# Patient Record
Sex: MALE | Race: WHITE | Employment: FULL TIME | URBAN - METROPOLITAN AREA
[De-identification: names, ages, dates, MRNs, and addresses within clinical notes are randomized per-mention and may not be internally consistent; named-entity substitution may affect disease eponyms.]

---

## 2024-05-14 ENCOUNTER — TELEPHONE (OUTPATIENT)
Age: 74
End: 2024-05-14

## 2024-05-14 NOTE — TELEPHONE ENCOUNTER
Patient wants to know when he should get blood work done before his 11/14 appointment.       Patient # [phone]

## 2024-05-14 NOTE — TELEPHONE ENCOUNTER
RC to pt, ID verified. Advised pt he was due to have cholesterol checked 4/2024, as that would be 1 year since last check. Pt understands and will have it checked soon. No further questions.

## 2024-05-15 DIAGNOSIS — I10 ESSENTIAL (PRIMARY) HYPERTENSION: ICD-10-CM

## 2024-05-15 DIAGNOSIS — I25.10 ATHEROSCLEROSIS OF NATIVE CORONARY ARTERY OF NATIVE HEART WITHOUT ANGINA PECTORIS: ICD-10-CM

## 2024-05-16 LAB
ALBUMIN SERPL-MCNC: 4.2 G/DL (ref 3.5–5)
ALBUMIN/GLOB SERPL: 1.5 (ref 1.1–2.2)
ALP SERPL-CCNC: 100 U/L (ref 45–117)
ALT SERPL-CCNC: 43 U/L (ref 12–78)
ANION GAP SERPL CALC-SCNC: 6 MMOL/L (ref 5–15)
AST SERPL-CCNC: 23 U/L (ref 15–37)
BILIRUB SERPL-MCNC: 0.7 MG/DL (ref 0.2–1)
BUN SERPL-MCNC: 14 MG/DL (ref 6–20)
BUN/CREAT SERPL: 14 (ref 12–20)
CALCIUM SERPL-MCNC: 10.1 MG/DL (ref 8.5–10.1)
CHLORIDE SERPL-SCNC: 108 MMOL/L (ref 97–108)
CHOLEST SERPL-MCNC: 152 MG/DL
CO2 SERPL-SCNC: 25 MMOL/L (ref 21–32)
CREAT SERPL-MCNC: 0.98 MG/DL (ref 0.7–1.3)
GLOBULIN SER CALC-MCNC: 2.8 G/DL (ref 2–4)
GLUCOSE SERPL-MCNC: 112 MG/DL (ref 65–100)
HDLC SERPL-MCNC: 53 MG/DL
HDLC SERPL: 2.9 (ref 0–5)
LDLC SERPL CALC-MCNC: 75.6 MG/DL (ref 0–100)
POTASSIUM SERPL-SCNC: 4.3 MMOL/L (ref 3.5–5.1)
PROT SERPL-MCNC: 7 G/DL (ref 6.4–8.2)
SODIUM SERPL-SCNC: 139 MMOL/L (ref 136–145)
TRIGL SERPL-MCNC: 117 MG/DL
VLDLC SERPL CALC-MCNC: 23.4 MG/DL

## 2024-05-20 LAB
COMMENT:: NORMAL
SPECIMEN HOLD: NORMAL

## 2024-06-05 NOTE — TELEPHONE ENCOUNTER
Rios Savage MD  You2 days ago       Yes it is okay     You  Rios Savage MD6 days ago     ES  His next visit isnt until November, is that okay?     Rios Savage MD  You; Ruma Bee, RN6 days ago       Lipids are Noted optimal will need a lower LDL we can discuss this at the next office visit.  Liver function test and BMP are normal.     TC to pt, ID verified. Advised as above. No further questions.

## 2024-11-08 DIAGNOSIS — I25.10 ATHEROSCLEROSIS OF NATIVE CORONARY ARTERY OF NATIVE HEART WITHOUT ANGINA PECTORIS: Primary | ICD-10-CM

## 2024-11-08 DIAGNOSIS — E78.5 HYPERLIPIDEMIA: ICD-10-CM

## 2024-11-11 DIAGNOSIS — E78.5 HYPERLIPIDEMIA: ICD-10-CM

## 2024-11-11 DIAGNOSIS — I25.10 ATHEROSCLEROSIS OF NATIVE CORONARY ARTERY OF NATIVE HEART WITHOUT ANGINA PECTORIS: ICD-10-CM

## 2024-11-11 LAB
ALBUMIN SERPL-MCNC: 4.3 G/DL (ref 3.5–5)
ALBUMIN/GLOB SERPL: 1.5 (ref 1.1–2.2)
ALP SERPL-CCNC: 95 U/L (ref 45–117)
ALT SERPL-CCNC: 46 U/L (ref 12–78)
AST SERPL-CCNC: 21 U/L (ref 15–37)
BILIRUB DIRECT SERPL-MCNC: 0.2 MG/DL (ref 0–0.2)
BILIRUB SERPL-MCNC: 0.8 MG/DL (ref 0.2–1)
CHOLEST SERPL-MCNC: 155 MG/DL
GLOBULIN SER CALC-MCNC: 2.8 G/DL (ref 2–4)
HDLC SERPL-MCNC: 54 MG/DL
HDLC SERPL: 2.9 (ref 0–5)
LDLC SERPL CALC-MCNC: 72.8 MG/DL (ref 0–100)
PROT SERPL-MCNC: 7.1 G/DL (ref 6.4–8.2)
TRIGL SERPL-MCNC: 141 MG/DL
VLDLC SERPL CALC-MCNC: 28.2 MG/DL

## 2024-11-14 ENCOUNTER — OFFICE VISIT (OUTPATIENT)
Age: 74
End: 2024-11-14

## 2024-11-14 ENCOUNTER — TELEPHONE (OUTPATIENT)
Age: 74
End: 2024-11-14

## 2024-11-14 VITALS
BODY MASS INDEX: 32.34 KG/M2 | SYSTOLIC BLOOD PRESSURE: 118 MMHG | HEIGHT: 73 IN | HEART RATE: 80 BPM | DIASTOLIC BLOOD PRESSURE: 82 MMHG | OXYGEN SATURATION: 93 % | WEIGHT: 244 LBS

## 2024-11-14 DIAGNOSIS — I25.10 ATHEROSCLEROSIS OF NATIVE CORONARY ARTERY OF NATIVE HEART WITHOUT ANGINA PECTORIS: Primary | ICD-10-CM

## 2024-11-14 DIAGNOSIS — I25.10 ATHEROSCLEROSIS OF NATIVE CORONARY ARTERY OF NATIVE HEART WITHOUT ANGINA PECTORIS: ICD-10-CM

## 2024-11-14 DIAGNOSIS — I10 ESSENTIAL (PRIMARY) HYPERTENSION: ICD-10-CM

## 2024-11-14 DIAGNOSIS — R06.02 SOB (SHORTNESS OF BREATH): ICD-10-CM

## 2024-11-14 DIAGNOSIS — E78.5 HYPERLIPIDEMIA: ICD-10-CM

## 2024-11-14 DIAGNOSIS — Z01.812 PRE-PROCEDURE LAB EXAM: ICD-10-CM

## 2024-11-14 DIAGNOSIS — R06.02 SHORTNESS OF BREATH: ICD-10-CM

## 2024-11-14 DIAGNOSIS — R06.02 SOB (SHORTNESS OF BREATH): Primary | ICD-10-CM

## 2024-11-14 LAB
ANION GAP SERPL CALC-SCNC: 5 MMOL/L (ref 2–12)
BUN SERPL-MCNC: 11 MG/DL (ref 6–20)
BUN/CREAT SERPL: 11 (ref 12–20)
CALCIUM SERPL-MCNC: 9.8 MG/DL (ref 8.5–10.1)
CHLORIDE SERPL-SCNC: 109 MMOL/L (ref 97–108)
CO2 SERPL-SCNC: 26 MMOL/L (ref 21–32)
CREAT SERPL-MCNC: 1.01 MG/DL (ref 0.7–1.3)
ERYTHROCYTE [DISTWIDTH] IN BLOOD BY AUTOMATED COUNT: 12.4 % (ref 11.5–14.5)
GLUCOSE SERPL-MCNC: 115 MG/DL (ref 65–100)
HCT VFR BLD AUTO: 46.4 % (ref 36.6–50.3)
HGB BLD-MCNC: 15.4 G/DL (ref 12.1–17)
MCH RBC QN AUTO: 30.1 PG (ref 26–34)
MCHC RBC AUTO-ENTMCNC: 33.2 G/DL (ref 30–36.5)
MCV RBC AUTO: 90.6 FL (ref 80–99)
NRBC # BLD: 0 K/UL (ref 0–0.01)
NRBC BLD-RTO: 0 PER 100 WBC
PLATELET # BLD AUTO: 220 K/UL (ref 150–400)
PMV BLD AUTO: 10.8 FL (ref 8.9–12.9)
POTASSIUM SERPL-SCNC: 4.4 MMOL/L (ref 3.5–5.1)
RBC # BLD AUTO: 5.12 M/UL (ref 4.1–5.7)
SODIUM SERPL-SCNC: 140 MMOL/L (ref 136–145)
WBC # BLD AUTO: 11.3 K/UL (ref 4.1–11.1)

## 2024-11-14 RX ORDER — ATORVASTATIN CALCIUM 40 MG/1
40 TABLET, FILM COATED ORAL DAILY
Qty: 90 TABLET | Refills: 1 | Status: SHIPPED | OUTPATIENT
Start: 2024-11-14

## 2024-11-14 ASSESSMENT — PATIENT HEALTH QUESTIONNAIRE - PHQ9
SUM OF ALL RESPONSES TO PHQ QUESTIONS 1-9: 0
SUM OF ALL RESPONSES TO PHQ9 QUESTIONS 1 & 2: 0
SUM OF ALL RESPONSES TO PHQ QUESTIONS 1-9: 0
2. FEELING DOWN, DEPRESSED OR HOPELESS: NOT AT ALL
1. LITTLE INTEREST OR PLEASURE IN DOING THINGS: NOT AT ALL
SUM OF ALL RESPONSES TO PHQ QUESTIONS 1-9: 0
SUM OF ALL RESPONSES TO PHQ QUESTIONS 1-9: 0

## 2024-11-14 NOTE — PROGRESS NOTES
1. Have you been to the ER, urgent care clinic since your last visit?  Hospitalized since your last visit?No    2. Have you seen or consulted any other health care providers outside of the [facility] since your last visit?  Include any pap smears or colon screening. No

## 2024-11-14 NOTE — TELEPHONE ENCOUNTER
Left message for patient to return call to schedule procedure.    ----- Message from RN Ruma RUIZ RN sent at 11/14/2024  9:16 AM EST -----  Regarding: [facility]  Please schedule [facility] ( [id number]) for CAD ( I25.10) and SOB ( R06.02). To  have labs obtained today.  Does not need to hold any medications.  Can be with Dr. Beck or Dr. Aguero. Patient going out of town from 11-17-24 to few days prior to Thanksgiving.  Follow up made with Dr. Savage on 1-3-24.  Can change if needed.  Thanks, Ruma

## 2024-11-14 NOTE — PROGRESS NOTES
[place] [facility]                                     DIVISION OF CARDIOLOGY                                                                             OFFICE NOTE                  VIC ONTIVEROS M.D. , [place]            KOFFI ADAMSON DIFOGGI   1950  [id number]    Date/Time:  11/14/20248:35 AM      /82 (Site: Left Upper Arm, Position: Sitting, Cuff Size: Large Adult)   Pulse 80   Ht 1.854 m (6' 1\")   Wt 110.7 kg (244 lb)   SpO2 93%   BMI 32.19 kg/m²        Wt Readings from Last 3 Encounters:   11/14/24 110.7 kg (244 lb)   12/21/23 108.9 kg (240 lb)   11/02/23 108.9 kg (240 lb)        Lab Results   Component Value Date    CHOL 155 11/11/2024    TRIG 141 11/11/2024    HDL 54 11/11/2024    LDL 72.8 11/11/2024    VLDL 28.2 11/11/2024    CHOLHDLRATIO 2.9 11/11/2024     Lab Results   Component Value Date    ALT 46 11/11/2024    AST 21 11/11/2024    ALKPHOS 95 11/11/2024    BILITOT 0.8 11/11/2024               SUBJECTIVE:  He tells me has been slowing down much more fatigue shortness of breath with activities is reported no clear chest pain.  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  He continues to try to be active but once again unable to do things he used to do before       Assessment/Plan    1.  CAD: Status post PCI of the LAD in March 2013.  He does have a residual significant RCA stenosis which usually is not detected by nuclear stress test in fact last nuclear stress test with December 2023 failed to reveal any ischemic AAA issues in inferior wall especially.    His electrocardiogram today is unchanged with a normal sinus rhythm right bundle branch block and left anterior fascicular block.  No significant ST-T changes.    Nonetheless some concern about the new onset symptoms I am not sure why repeating nuclear stress test will be very helpful in his case given already known underlying coronary disease.    We have discussed this in a shared decision decided

## 2024-11-14 NOTE — TELEPHONE ENCOUNTER
Spoke to patient and scheduled him for [facility] with Dr. Aguero on 12/5 @ 10:45 am with 8:45 am arrival. Instructions sent to patient via my chart and advised labs already done, no medications to hold. Patient verbalized understanding and had no questions at the time of call.    Patient identification verified x2.     Patient Instructions    Catheterization   Pre-procedure instructions  Lab work: Already done.  [facility] Draw Sites:  [facility] [place]: [address] [facility] [address]  [place]: [address] [facility] [address]  [place]: [address] [address] [facility] [address]  [facility]: [address] [facility] [address]  [place]: [address] [facility] [address]  [facility]: [address] [facility] [address]  [place]: [address] [facility] [address]  [facility]: [address]. [place] [address]  You may have clear liquids up to 2 hours prior to your procedure and a light meal up to 6 hours prior to your procedure.   Drink ten 8oz. glasses of water for 3-days prior-to and 3-days post cardiac cath.  Stop blood thinners 2 days prior to procedure EXCEPT: Brilinta, Plavix or Aspirin  Medications restrictions: No medications to hold.    Procedure day  Have a  that will bring you and take you home (6-8 hours)  Have a responsible adult that can stay with you for 24 hours  Bring ID and insurance card  Wear comfortable clothing  Leave valuables at home,   Bring: dentures, hearing aids, or glasses  Bring overnight bag (just in case of an overnight stay)  Where to report  [facility]: go through main entrance and to the left is outpatient registration    Date of procedure: 12/5 w/ Dr. Aguero  Arrival Time:  8:45 am    Post procedure instructions  No driving for 24 hours  No heavy lifting (over 10lbs) or strenuous activity for 48 hours  No baths, swimming, hot

## 2024-11-14 NOTE — PATIENT INSTRUCTIONS
You will be scheduled for a cardiac catheterization. Expect a call from Emily to schedule.  You may have labs obtained today.    Increase your lipitor to 40 mg nightly.  Please have fasting (nothing to eat/drink except for water for 8 hours) labs obtained in 3 months.  Our office will call you with results.    Schedule follow up with Dr. Savage or nurse practitioner 2-4 weeks after procedure.

## 2024-11-18 ENCOUNTER — PREP FOR PROCEDURE (OUTPATIENT)
Age: 74
End: 2024-11-18

## 2024-11-18 DIAGNOSIS — R06.02 SHORTNESS OF BREATH: Primary | ICD-10-CM

## 2024-11-18 DIAGNOSIS — I25.10 CORONARY ATHEROSCLEROSIS OF NATIVE CORONARY ARTERY: ICD-10-CM

## 2024-11-26 RX ORDER — SODIUM CHLORIDE 0.9 % (FLUSH) 0.9 %
5-40 SYRINGE (ML) INJECTION PRN
Status: CANCELLED | OUTPATIENT
Start: 2024-11-26

## 2024-12-05 ENCOUNTER — HOSPITAL ENCOUNTER (OUTPATIENT)
Facility: HOSPITAL | Age: 74
Discharge: HOME | End: 2024-12-05
Attending: INTERNAL MEDICINE | Admitting: INTERNAL MEDICINE
Payer: MEDICARE

## 2024-12-05 VITALS
RESPIRATION RATE: 24 BRPM | SYSTOLIC BLOOD PRESSURE: 151 MMHG | HEART RATE: 71 BPM | TEMPERATURE: 98.3 F | WEIGHT: 235 LBS | BODY MASS INDEX: 31.14 KG/M2 | OXYGEN SATURATION: 92 % | DIASTOLIC BLOOD PRESSURE: 82 MMHG | HEIGHT: 73 IN

## 2024-12-05 DIAGNOSIS — I25.10 CORONARY ATHEROSCLEROSIS OF NATIVE CORONARY ARTERY: ICD-10-CM

## 2024-12-05 DIAGNOSIS — R06.02 SHORTNESS OF BREATH: ICD-10-CM

## 2024-12-05 LAB
ACT BLD: 233 SECS (ref 79–138)
ECHO BSA: 2.34 M2

## 2024-12-05 PROCEDURE — 85347 COAGULATION TIME ACTIVATED: CPT

## 2024-12-05 PROCEDURE — 6360000004 HC RX CONTRAST MEDICATION: Performed by: INTERNAL MEDICINE

## 2024-12-05 PROCEDURE — 2580000003 HC RX 258: Performed by: INTERNAL MEDICINE

## 2024-12-05 PROCEDURE — C1769 GUIDE WIRE: HCPCS | Performed by: INTERNAL MEDICINE

## 2024-12-05 PROCEDURE — 93571 IV DOP VEL&/PRESS C FLO 1ST: CPT | Performed by: INTERNAL MEDICINE

## 2024-12-05 PROCEDURE — 99152 MOD SED SAME PHYS/QHP 5/>YRS: CPT | Performed by: INTERNAL MEDICINE

## 2024-12-05 PROCEDURE — 92978 ENDOLUMINL IVUS OCT C 1ST: CPT | Performed by: INTERNAL MEDICINE

## 2024-12-05 PROCEDURE — G0269 OCCLUSIVE DEVICE IN VEIN ART: HCPCS | Performed by: INTERNAL MEDICINE

## 2024-12-05 PROCEDURE — 2500000003 HC RX 250 WO HCPCS: Performed by: INTERNAL MEDICINE

## 2024-12-05 PROCEDURE — 93458 L HRT ARTERY/VENTRICLE ANGIO: CPT | Performed by: INTERNAL MEDICINE

## 2024-12-05 PROCEDURE — 2709999900 HC NON-CHARGEABLE SUPPLY: Performed by: INTERNAL MEDICINE

## 2024-12-05 PROCEDURE — C1887 CATHETER, GUIDING: HCPCS | Performed by: INTERNAL MEDICINE

## 2024-12-05 PROCEDURE — C1713 ANCHOR/SCREW BN/BN,TIS/BN: HCPCS | Performed by: INTERNAL MEDICINE

## 2024-12-05 PROCEDURE — 99153 MOD SED SAME PHYS/QHP EA: CPT | Performed by: INTERNAL MEDICINE

## 2024-12-05 PROCEDURE — 6360000002 HC RX W HCPCS: Performed by: INTERNAL MEDICINE

## 2024-12-05 PROCEDURE — 6370000000 HC RX 637 (ALT 250 FOR IP): Performed by: NURSE PRACTITIONER

## 2024-12-05 PROCEDURE — 76937 US GUIDE VASCULAR ACCESS: CPT | Performed by: INTERNAL MEDICINE

## 2024-12-05 PROCEDURE — C1894 INTRO/SHEATH, NON-LASER: HCPCS | Performed by: INTERNAL MEDICINE

## 2024-12-05 RX ORDER — FENTANYL CITRATE 50 UG/ML
INJECTION, SOLUTION INTRAMUSCULAR; INTRAVENOUS PRN
Status: DISCONTINUED | OUTPATIENT
Start: 2024-12-05 | End: 2024-12-05 | Stop reason: HOSPADM

## 2024-12-05 RX ORDER — IOPAMIDOL 755 MG/ML
INJECTION, SOLUTION INTRAVASCULAR PRN
Status: DISCONTINUED | OUTPATIENT
Start: 2024-12-05 | End: 2024-12-05 | Stop reason: HOSPADM

## 2024-12-05 RX ORDER — LIDOCAINE HYDROCHLORIDE 10 MG/ML
INJECTION, SOLUTION INFILTRATION; PERINEURAL PRN
Status: DISCONTINUED | OUTPATIENT
Start: 2024-12-05 | End: 2024-12-05 | Stop reason: HOSPADM

## 2024-12-05 RX ORDER — SODIUM CHLORIDE 0.9 % (FLUSH) 0.9 %
5-40 SYRINGE (ML) INJECTION PRN
Status: DISCONTINUED | OUTPATIENT
Start: 2024-12-05 | End: 2024-12-05 | Stop reason: HOSPADM

## 2024-12-05 RX ORDER — VERAPAMIL HYDROCHLORIDE 2.5 MG/ML
INJECTION, SOLUTION INTRAVENOUS PRN
Status: DISCONTINUED | OUTPATIENT
Start: 2024-12-05 | End: 2024-12-05 | Stop reason: HOSPADM

## 2024-12-05 RX ORDER — SODIUM CHLORIDE 0.9 % (FLUSH) 0.9 %
5-40 SYRINGE (ML) INJECTION EVERY 12 HOURS SCHEDULED
Status: DISCONTINUED | OUTPATIENT
Start: 2024-12-05 | End: 2024-12-05 | Stop reason: HOSPADM

## 2024-12-05 RX ORDER — SODIUM CHLORIDE 9 MG/ML
INJECTION, SOLUTION INTRAVENOUS PRN
Status: DISCONTINUED | OUTPATIENT
Start: 2024-12-05 | End: 2024-12-05 | Stop reason: HOSPADM

## 2024-12-05 RX ORDER — SODIUM CHLORIDE 9 MG/ML
INJECTION, SOLUTION INTRAVENOUS CONTINUOUS PRN
Status: COMPLETED | OUTPATIENT
Start: 2024-12-05 | End: 2024-12-05

## 2024-12-05 RX ORDER — MIDAZOLAM HYDROCHLORIDE 1 MG/ML
INJECTION, SOLUTION INTRAMUSCULAR; INTRAVENOUS PRN
Status: DISCONTINUED | OUTPATIENT
Start: 2024-12-05 | End: 2024-12-05 | Stop reason: HOSPADM

## 2024-12-05 RX ORDER — ACETAMINOPHEN 325 MG/1
650 TABLET ORAL EVERY 4 HOURS PRN
Status: DISCONTINUED | OUTPATIENT
Start: 2024-12-05 | End: 2024-12-05 | Stop reason: HOSPADM

## 2024-12-05 RX ORDER — HYDRALAZINE HYDROCHLORIDE 20 MG/ML
10 INJECTION INTRAMUSCULAR; INTRAVENOUS ONCE
Status: COMPLETED | OUTPATIENT
Start: 2024-12-05 | End: 2024-12-05

## 2024-12-05 RX ORDER — ASPIRIN 81 MG/1
81 TABLET ORAL DAILY
Qty: 90 TABLET | Refills: 1 | Status: SHIPPED | OUTPATIENT
Start: 2024-12-05

## 2024-12-05 RX ORDER — HEPARIN SODIUM 1000 [USP'U]/ML
INJECTION, SOLUTION INTRAVENOUS; SUBCUTANEOUS PRN
Status: DISCONTINUED | OUTPATIENT
Start: 2024-12-05 | End: 2024-12-05 | Stop reason: HOSPADM

## 2024-12-05 RX ADMIN — ACETAMINOPHEN 650 MG: 325 TABLET ORAL at 18:48

## 2024-12-05 RX ADMIN — HYDRALAZINE HYDROCHLORIDE 10 MG: 20 INJECTION INTRAMUSCULAR; INTRAVENOUS at 17:29

## 2024-12-05 NOTE — PROGRESS NOTES
1532: Updated wife via telephone regarding patient status and discharge disposition.     1837: Head of bed elevated 30degrees right groin site without bleeding and no hematoma.     1845: Bedside shift change report given to Marta VALLE (oncoming nurse) by Carley VALLE (offgoing nurse). Report included the following information Surgery Report, Intake/Output, MAR, and Recent Results.

## 2024-12-05 NOTE — PROGRESS NOTES
SHEATH PULL NOTE:    Patient informed of procedure with questions answered with review. Sheath site prepped with Chloraprep swab. 6 fr sheath in right femoral artery pulled by LEONARD Howe. Hand hold and quick clot, with manual compression to site. No bleeding, no hematoma, no pain at site. Hemostasis obtained with hand hold/manual compression at site. Patient tolerated well. No change in status. Handhold for 15 minutes. No change at site. Sterile gauze and tegaderm dressing applied to site. No bleeding, no hematoma, no pain/discomfort at site. Groin instructions provided with review. Continue to monitor procedure site and patient status.    *Advised patient to keep head flat and extremity flat to decrease risk of bleeding.

## 2024-12-05 NOTE — PROGRESS NOTES
Cardiac Cath Lab Procedure Area Arrival Note:    Juanjo Morocho arrived to Cardiac Cath Lab, Procedure Area. Patient identifiers verified with NAME and DATE OF BIRTH. Procedure verified with patient. Consent forms verified. Allergies verified. Patient informed of procedure and plan of care. Questions answered with review. Patient voiced understanding of procedure and plan of care.    Patient on cardiac monitor, non-invasive blood pressure, SPO2 monitor. On RA and placed on O2 @ 2 lpm via NC.  IV of NS on pump at 50 ml/hr. Patient status doing well without problems. Patient is A&Ox 4. Patient reports no CP and no SOB.     Patient medicated during procedure with orders obtained and verified by Dr. Aguero.    Refer to patients Cardiac Cath Lab PROCEDURE REPORT for vital signs, assessment, status, and response during procedure, printed at end of case. Printed report on chart or scanned into chart.     1400 CATH LAB to RECOVERY ROOM REPORT    Procedure: [facility]    MD: JOSE Aguero MD    The procedure was diagnostic only.    Verbal Report given to Recovery Nurse on patient being transferred to Cardiac Cath Lab RR for routine post-op. Patient stable upon transfer to .  Vitals, mental status, MAR, procedural summary discussed with recovery RN.    Medication given during procedure:    Contrast:105mL                          Heparin:14,000units     Versed:5mg                                                               Fentanyl:100mcg                                                             Sheaths:    Right radial sheath pulled at 1400 , band at 12mL of air    Right femoral artery 6fr sheath left in place, secured with suture.

## 2024-12-05 NOTE — PROGRESS NOTES
1845: Bedside and Verbal shift change report given to Marta RN (oncoming nurse) by Carley RN (offgoing nurse). Report included the following information Nurse Handoff Report, Surgery Report, Intake/Output, and MAR.

## 2024-12-05 NOTE — PROGRESS NOTES
Cardiac Cath Lab Recovery Arrival Note:      Juanjo Morocho arrived to Cardiac Cath Lab, Recovery Area. Staff introduced to patient. Patient identifiers verified with NAME and DATE OF BIRTH. Procedure verified with patient. Consent forms reviewed and signed by patient or authorized representative and verified. Allergies verified.     Patient and family oriented to department. Patient and family informed of procedure and plan of care.     Questions answered with review. Patient prepped for procedure, per orders from physician, prior to arrival.    Patient on cardiac monitor, non-invasive blood pressure, SPO2 monitor. On room air. Patient is A&Ox 4. Patient reports no pain.     Patient in stretcher, in low position, with side rails up, call bell within reach, patient instructed to call if assistance as needed.    Patient prep in: [facility] Recovery Area, [place] FAST TRACK 4.   Patient family has pager # [phone]  Family in: [phone].   Prep by: INGRID

## 2024-12-05 NOTE — PROGRESS NOTES
TRANSFER - IN Cath Lab RR REPORT:    Verbal report received from Ashlyn ARANA on Juanjo Morocho  being received from the Cardiac Cath Lab for routine progression of care. Report consisted of patient’s Situation, Background, Assessment and Recommendations(SBAR). Procedural summary and MAR reviewed with receiving nurse. Opportunity for questions and clarification was provided. Assessment completed upon patient’s arrival to Cardiac Cath Lab RECOVERY AREA and care assumed.       Cardiac Cath Lab Recovery Arrival Note:    Juanjo Morocho arrived to CCL recovery area.  Patient procedure= LHC and IFR. Patient on cardiac monitor, non-invasive blood pressure, SPO2 monitor. On RA. Patient is A&Ox 4. Patient reports no complaints.    PROCEDURE SITE CHECK:    Procedure site: No bleeding and no hematoma, no pain/discomfort reported at procedure site.     No change in patient status. Continue to monitor patient and status.

## 2024-12-06 NOTE — PROGRESS NOTES
Ambulated patient to the bathroom with a steady gait with standby assist, voided without difficulty. Returned to stretcher. Vital signs stable.     Site is clean, dry, and intact. No bleeding, no hematoma.     Patient dressed self.   Educated patient about their sedation precautions such as not driving, operating any machinery, or signing legal documents 24 hours post procedure.     Reviewed discharge instructions, including medications, and site care using the teach back method. Answered all questions. Verbalized understanding.     Removed peripheral IV    Escorted to discharge area in a wheelchair with all of their belongings including clothing, glasses    spouse present to take patient home. Reviewed discharge instructions with spouse. Verbalized understanding.

## 2024-12-12 LAB
ACT BLD: 170 SECS (ref 79–138)
ACT BLD: 205 SECS (ref 79–138)

## 2025-01-29 NOTE — PROGRESS NOTES
Patient: Juanjo Morocho  : 1950    Primary Cardiologist: Dr. Savage  EP Cardiologist:NONE   PCP: Ricardo Abreu MD    Today's Date: 2025      ASSESSMENT AND PLAN:     Assessment and Plan:  Assessment & Plan  Atherosclerosis of native coronary artery of native heart without angina pectoris  -Status post PCI of the LAD in 2013.  He does have a residual significant RCA stenosis which usually is not detected by nuclear stress test in fact last nuclear stress test with 2023 failed to reveal any ischemic AAA issues in inferior wall especially.     His electrocardiogram today is unchanged with a normal sinus rhythm right bundle branch block and left anterior fascicular block.  No significant ST-T changes.    Due to concern for new onset of symptoms, and Dr. Savage did not feel nuclear stress test would be helpful given known underlying CAD. It was decided in shared decision making with the patient to proceed with cardiac cath. Patient was agreeable and understanding of all risks and benefits of cardiac cath.     Continued Toprol Lipitor and aspirin.     Had LHC with Dr. Aguero on 24. Angiographically mild to moderate nonobstructive coronary artery disease, with mild in-stent restenosis within the previously placed LAD stents, and a smooth 50% RCA narrowing which was proven to be non flow-limiting by PressureWire interrogation. Aggressive medical management recommended.  Mixed hyperlipidemia  -hepatic panel 2024 [place]  -LDL 2024 72.8- Per Dr. Savage note would like under 55  -increased to Lipitor to 40 mg daily mid 2024  -order to recheck labs prior to follow-up with Dr. Savage    Orders:    Lipid Panel; Future    Primary hypertension  -Currently well controlled no changes in medications.  NASEEM (obstructive sleep apnea)  -On CPAP.       Erectile dysfunction, unspecified erectile dysfunction type  -Complains of ED Toprol may impart contribute

## 2025-01-30 ENCOUNTER — OFFICE VISIT (OUTPATIENT)
Age: 75
End: 2025-01-30
Payer: MEDICARE

## 2025-01-30 VITALS
SYSTOLIC BLOOD PRESSURE: 128 MMHG | HEART RATE: 88 BPM | DIASTOLIC BLOOD PRESSURE: 80 MMHG | WEIGHT: 248 LBS | OXYGEN SATURATION: 96 % | HEIGHT: 73 IN | BODY MASS INDEX: 32.87 KG/M2

## 2025-01-30 DIAGNOSIS — I25.10 ATHEROSCLEROSIS OF NATIVE CORONARY ARTERY OF NATIVE HEART WITHOUT ANGINA PECTORIS: Primary | ICD-10-CM

## 2025-01-30 DIAGNOSIS — E78.2 MIXED HYPERLIPIDEMIA: ICD-10-CM

## 2025-01-30 DIAGNOSIS — I10 PRIMARY HYPERTENSION: ICD-10-CM

## 2025-01-30 DIAGNOSIS — N52.9 ERECTILE DYSFUNCTION, UNSPECIFIED ERECTILE DYSFUNCTION TYPE: ICD-10-CM

## 2025-01-30 DIAGNOSIS — G47.33 OSA (OBSTRUCTIVE SLEEP APNEA): ICD-10-CM

## 2025-01-30 PROCEDURE — 3079F DIAST BP 80-89 MM HG: CPT

## 2025-01-30 PROCEDURE — 3074F SYST BP LT 130 MM HG: CPT

## 2025-01-30 PROCEDURE — 1036F TOBACCO NON-USER: CPT

## 2025-01-30 PROCEDURE — 1123F ACP DISCUSS/DSCN MKR DOCD: CPT

## 2025-01-30 PROCEDURE — 1159F MED LIST DOCD IN RCRD: CPT

## 2025-01-30 PROCEDURE — 99214 OFFICE O/P EST MOD 30 MIN: CPT

## 2025-01-30 PROCEDURE — 1126F AMNT PAIN NOTED NONE PRSNT: CPT

## 2025-01-30 PROCEDURE — G8417 CALC BMI ABV UP PARAM F/U: HCPCS

## 2025-01-30 PROCEDURE — G8427 DOCREV CUR MEDS BY ELIG CLIN: HCPCS

## 2025-01-30 ASSESSMENT — PATIENT HEALTH QUESTIONNAIRE - PHQ9
2. FEELING DOWN, DEPRESSED OR HOPELESS: NOT AT ALL
SUM OF ALL RESPONSES TO PHQ QUESTIONS 1-9: 0
1. LITTLE INTEREST OR PLEASURE IN DOING THINGS: NOT AT ALL
SUM OF ALL RESPONSES TO PHQ QUESTIONS 1-9: 0
SUM OF ALL RESPONSES TO PHQ9 QUESTIONS 1 & 2: 0
SUM OF ALL RESPONSES TO PHQ QUESTIONS 1-9: 0
SUM OF ALL RESPONSES TO PHQ QUESTIONS 1-9: 0

## 2025-01-30 NOTE — ASSESSMENT & PLAN NOTE
-Status post PCI of the LAD in March 2013.  He does have a residual significant RCA stenosis which usually is not detected by nuclear stress test in fact last nuclear stress test with December 2023 failed to reveal any ischemic AAA issues in inferior wall especially.     His electrocardiogram today is unchanged with a normal sinus rhythm right bundle branch block and left anterior fascicular block.  No significant ST-T changes.    Due to concern for new onset of symptoms, and Dr. Savage did not feel nuclear stress test would be helpful given known underlying CAD. It was decided in shared decision making with the patient to proceed with cardiac cath. Patient was agreeable and understanding of all risks and benefits of cardiac cath.     Continued Toprol Lipitor and aspirin.     Had LHC with Dr. Aguero on 12/5/24. Angiographically mild to moderate nonobstructive coronary artery disease, with mild in-stent restenosis within the previously placed LAD stents, and a smooth 50% RCA narrowing which was proven to be non flow-limiting by PressureWire interrogation. Aggressive medical management recommended.

## 2025-01-30 NOTE — ASSESSMENT & PLAN NOTE
-hepatic panel November 2024 [place]  -LDL November 2024 72.8- Per Dr. Savage note would like under 55  -increased to Lipitor to 40 mg daily mid November 2024  -order to recheck labs prior to follow-up with Dr. Savage    Orders:    Lipid Panel; Future

## 2025-02-24 RX ORDER — METOPROLOL SUCCINATE 25 MG/1
TABLET, EXTENDED RELEASE ORAL
Qty: 90 TABLET | Refills: 3 | Status: SHIPPED | OUTPATIENT
Start: 2025-02-24

## 2025-05-11 DIAGNOSIS — I25.10 ATHEROSCLEROSIS OF NATIVE CORONARY ARTERY OF NATIVE HEART WITHOUT ANGINA PECTORIS: ICD-10-CM

## 2025-05-11 DIAGNOSIS — I10 ESSENTIAL (PRIMARY) HYPERTENSION: ICD-10-CM

## 2025-05-12 RX ORDER — ATORVASTATIN CALCIUM 40 MG/1
40 TABLET, FILM COATED ORAL DAILY
Qty: 90 TABLET | Refills: 1 | Status: SHIPPED | OUTPATIENT
Start: 2025-05-12

## 2025-05-12 NOTE — TELEPHONE ENCOUNTER
Requested Prescriptions     Signed Prescriptions Disp Refills    atorvastatin (LIPITOR) 40 MG tablet 90 tablet 1     Sig: Take 1 tablet by mouth daily     Authorizing Provider: RIOS SAVAGE     Ordering User: ANNMARIE MORE     Verbal order per Dr. Savage.    Future Appointments   Date Time Provider Department Center   7/24/2025  8:20 AM Rios Savage MD [facility]

## 2025-07-17 DIAGNOSIS — I25.10 ATHEROSCLEROSIS OF NATIVE CORONARY ARTERY OF NATIVE HEART WITHOUT ANGINA PECTORIS: ICD-10-CM

## 2025-07-17 DIAGNOSIS — E78.2 MIXED HYPERLIPIDEMIA: ICD-10-CM

## 2025-07-17 DIAGNOSIS — E78.5 HYPERLIPIDEMIA: ICD-10-CM

## 2025-07-18 LAB
ALBUMIN SERPL-MCNC: 4 G/DL (ref 3.5–5)
ALBUMIN/GLOB SERPL: 1.4 (ref 1.1–2.2)
ALP SERPL-CCNC: 98 U/L (ref 45–117)
ALT SERPL-CCNC: 64 U/L (ref 12–78)
AST SERPL-CCNC: 33 U/L (ref 15–37)
BILIRUB DIRECT SERPL-MCNC: 0.1 MG/DL (ref 0–0.2)
BILIRUB SERPL-MCNC: 0.5 MG/DL (ref 0.2–1)
CHOLEST SERPL-MCNC: 113 MG/DL
GLOBULIN SER CALC-MCNC: 2.9 G/DL (ref 2–4)
HDLC SERPL-MCNC: 42 MG/DL
HDLC SERPL: 2.7 (ref 0–5)
LDLC SERPL CALC-MCNC: 39.2 MG/DL (ref 0–100)
PROT SERPL-MCNC: 6.9 G/DL (ref 6.4–8.2)
TRIGL SERPL-MCNC: 159 MG/DL
VLDLC SERPL CALC-MCNC: 31.8 MG/DL

## 2025-07-23 NOTE — PROGRESS NOTES
[place] [facility]                                     DIVISION OF CARDIOLOGY                                                                             OFFICE NOTE                  VIC ONTIVEROS M.D. , [place]            KOFFI ADAMSON DIFOGGI   1950  [id number]    Date/Time:  7/24/20258:36 AM      /70   Pulse 80   Resp 16   Ht 1.854 m (6' 1\")   Wt 116.1 kg (256 lb)   SpO2 95%   BMI 33.78 kg/m²        Wt Readings from Last 3 Encounters:   07/24/25 116.1 kg (256 lb)   01/30/25 112.5 kg (248 lb)   12/05/24 106.6 kg (235 lb)          Lab Results   Component Value Date    CHOL 113 07/17/2025    TRIG 159 (H) 07/17/2025    HDL 42 07/17/2025    LDL 39.2 07/17/2025    VLDL 31.8 07/17/2025    CHOLHDLRATIO 2.7 07/17/2025       Lab Results   Component Value Date    ALT 64 07/17/2025    AST 33 07/17/2025    ALKPHOS 98 07/17/2025    BILITOT 0.5 07/17/2025             SUBJECTIVE:  He is doing very well is completely asymptomatic cardiac wise no chest pain no shortness of breath no palpitation 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  CAD: Status post PCI of the LAD in March 2013.  He does have a residual significant RCA stenosis which usually is not detected by nuclear stress test in fact last nuclear stress test with December 2023 failed to reveal any ischemic  issues in inferior wall especially.     His electrocardiogram today is unchanged with a normal sinus rhythm right bundle branch block and left anterior fascicular block.  No significant ST-T changes.     Status post cardiac catheterization in December thousand 24 with mild to moderate in-stent restenosis of the LAD and 50% smooth RCA stenosis.     Continue aggressive pharmacological therapy and reduction risk factors for now and is asymptomatic patient.     Continue with Toprol Lipitor and aspirin at this time.     2.

## 2025-07-24 ENCOUNTER — OFFICE VISIT (OUTPATIENT)
Age: 75
End: 2025-07-24
Payer: MEDICARE

## 2025-07-24 VITALS
DIASTOLIC BLOOD PRESSURE: 70 MMHG | BODY MASS INDEX: 33.93 KG/M2 | RESPIRATION RATE: 16 BRPM | SYSTOLIC BLOOD PRESSURE: 100 MMHG | HEART RATE: 80 BPM | WEIGHT: 256 LBS | HEIGHT: 73 IN | OXYGEN SATURATION: 95 %

## 2025-07-24 DIAGNOSIS — I25.10 ATHEROSCLEROSIS OF NATIVE CORONARY ARTERY OF NATIVE HEART WITHOUT ANGINA PECTORIS: Primary | ICD-10-CM

## 2025-07-24 PROCEDURE — 1126F AMNT PAIN NOTED NONE PRSNT: CPT | Performed by: SPECIALIST

## 2025-07-24 PROCEDURE — 93000 ELECTROCARDIOGRAM COMPLETE: CPT | Performed by: SPECIALIST

## 2025-07-24 PROCEDURE — 3074F SYST BP LT 130 MM HG: CPT | Performed by: SPECIALIST

## 2025-07-24 PROCEDURE — 1123F ACP DISCUSS/DSCN MKR DOCD: CPT | Performed by: SPECIALIST

## 2025-07-24 PROCEDURE — 99214 OFFICE O/P EST MOD 30 MIN: CPT | Performed by: SPECIALIST

## 2025-07-24 PROCEDURE — G2211 COMPLEX E/M VISIT ADD ON: HCPCS | Performed by: SPECIALIST

## 2025-07-24 PROCEDURE — G8417 CALC BMI ABV UP PARAM F/U: HCPCS | Performed by: SPECIALIST

## 2025-07-24 PROCEDURE — G8427 DOCREV CUR MEDS BY ELIG CLIN: HCPCS | Performed by: SPECIALIST

## 2025-07-24 PROCEDURE — 3078F DIAST BP <80 MM HG: CPT | Performed by: SPECIALIST

## 2025-07-24 PROCEDURE — 1036F TOBACCO NON-USER: CPT | Performed by: SPECIALIST

## 2025-07-24 PROCEDURE — 1159F MED LIST DOCD IN RCRD: CPT | Performed by: SPECIALIST

## 2025-07-24 RX ORDER — TRIAMCINOLONE ACETONIDE 1 MG/G
CREAM TOPICAL
COMMUNITY
Start: 2025-07-17

## 2025-07-24 ASSESSMENT — PATIENT HEALTH QUESTIONNAIRE - PHQ9
SUM OF ALL RESPONSES TO PHQ QUESTIONS 1-9: 0
SUM OF ALL RESPONSES TO PHQ QUESTIONS 1-9: 0
2. FEELING DOWN, DEPRESSED OR HOPELESS: NOT AT ALL
1. LITTLE INTEREST OR PLEASURE IN DOING THINGS: NOT AT ALL
SUM OF ALL RESPONSES TO PHQ QUESTIONS 1-9: 0
SUM OF ALL RESPONSES TO PHQ QUESTIONS 1-9: 0

## (undated) DEVICE — VALVE HEMSTAS W/ GWIRE INSRTN TOOL FOR 8FR DEV GRDIAN II

## (undated) DEVICE — PINNACLE INTRODUCER SHEATH: Brand: PINNACLE

## (undated) DEVICE — ANGIOGRAPHIC CATHETER: Brand: IMPULSE™

## (undated) DEVICE — KIT MED IMAG CNTRST AGNT W/ IOPAMIDOL REUSE

## (undated) DEVICE — TR BAND RADIAL ARTERY COMPRESSION DEVICE: Brand: TR BAND

## (undated) DEVICE — SPLINT WR VELC FOAM NEUT POS DISP FOR RAD ART ACC SFT STRP

## (undated) DEVICE — GUIDEWIRE VASC J 3 MM 0.035 INX210 CM FIX COR INQWIRE

## (undated) DEVICE — WASTE KIT - ST MARY: Brand: MEDLINE INDUSTRIES, INC.

## (undated) DEVICE — PRESSURE MONITORING SET: Brand: TRUWAVE

## (undated) DEVICE — CATHETER DIAG 5FR L100CM LUMN ID0.047IN JR4 CRV 0 SIDE H

## (undated) DEVICE — PADPRO DEFIBRILLATION/PACING/CARDIOVERSION/MONITORING ELECTRODES, ADULT/CHILD GREATER THAN 10 KG RADIOTRANSPARENT ELECTRODE, PHYSIO-CONTROL QUIK-COMBO (M) 60" (152 CM): Brand: PADPRO

## (undated) DEVICE — KIT HND CTRL 3 W STPCOCK ROT END 54IN PREM HI PRSS TBNG AT

## (undated) DEVICE — Device

## (undated) DEVICE — ANGIOGRAPHY KIT

## (undated) DEVICE — GLIDESHEATH SLENDER ACCESS KIT: Brand: GLIDESHEATH SLENDER

## (undated) DEVICE — KIT AT-X65 ANGIOTOUCH HAND CONTROLLER

## (undated) DEVICE — KIT MFLD ISOLATN NACL CNTRST PRT TBNG SPIK W/ PRSS TRNSDUC

## (undated) DEVICE — CATHETER DIAG 5FR L100CM LUMN ID0.047IN JL3.5 CRV 0 SIDE H

## (undated) DEVICE — PROVE COVER: Brand: UNBRANDED

## (undated) DEVICE — Device: Brand: OMNIWIRE PRESSURE GUIDE WIRE

## (undated) DEVICE — HI-TORQUE VERSACORE MODIFIED J GUIDE WIRE SYSTEM 145 CM: Brand: HI-TORQUE VERSACORE

## (undated) DEVICE — TUBING PRSS MON L6IN PVC M FEM CONN

## (undated) DEVICE — SPECIAL PROCEDURE DRAPE 32" X 34": Brand: SPECIAL PROCEDURE DRAPE

## (undated) DEVICE — CATHETER GUID 6FR 0.071IN COR AMPLATZ L 0.75 MID

## (undated) DEVICE — SUTURE PERMA-HAND SZ 2-0 L30IN NONABSORBABLE BLK L26MM SH K833H